# Patient Record
Sex: MALE | Race: BLACK OR AFRICAN AMERICAN | NOT HISPANIC OR LATINO | Employment: FULL TIME | URBAN - METROPOLITAN AREA
[De-identification: names, ages, dates, MRNs, and addresses within clinical notes are randomized per-mention and may not be internally consistent; named-entity substitution may affect disease eponyms.]

---

## 2023-12-20 ENCOUNTER — APPOINTMENT (OUTPATIENT)
Dept: RADIOLOGY | Facility: HOSPITAL | Age: 53
End: 2023-12-20
Payer: COMMERCIAL

## 2023-12-20 ENCOUNTER — ANCILLARY PROCEDURE (OUTPATIENT)
Dept: EMERGENCY MEDICINE | Facility: HOSPITAL | Age: 53
End: 2023-12-20
Payer: COMMERCIAL

## 2023-12-20 ENCOUNTER — HOSPITAL ENCOUNTER (EMERGENCY)
Facility: HOSPITAL | Age: 53
Discharge: HOME | End: 2023-12-20
Payer: COMMERCIAL

## 2023-12-20 VITALS
WEIGHT: 145 LBS | OXYGEN SATURATION: 99 % | HEIGHT: 67 IN | BODY MASS INDEX: 22.76 KG/M2 | SYSTOLIC BLOOD PRESSURE: 179 MMHG | DIASTOLIC BLOOD PRESSURE: 98 MMHG | HEART RATE: 93 BPM | TEMPERATURE: 97.7 F | RESPIRATION RATE: 16 BRPM

## 2023-12-20 DIAGNOSIS — I10 HYPERTENSION, UNSPECIFIED TYPE: ICD-10-CM

## 2023-12-20 DIAGNOSIS — M10.9 ACUTE GOUT OF LEFT WRIST, UNSPECIFIED CAUSE: Primary | ICD-10-CM

## 2023-12-20 LAB
ALBUMIN SERPL BCP-MCNC: 4.3 G/DL (ref 3.4–5)
ALP SERPL-CCNC: 80 U/L (ref 33–120)
ALT SERPL W P-5'-P-CCNC: 14 U/L (ref 10–52)
ANION GAP SERPL CALC-SCNC: 15 MMOL/L (ref 10–20)
AST SERPL W P-5'-P-CCNC: 19 U/L (ref 9–39)
BASOPHILS # BLD AUTO: 0.03 X10*3/UL (ref 0–0.1)
BASOPHILS NFR BLD AUTO: 0.5 %
BILIRUB SERPL-MCNC: 1.2 MG/DL (ref 0–1.2)
BUN SERPL-MCNC: 9 MG/DL (ref 6–23)
CALCIUM SERPL-MCNC: 9.6 MG/DL (ref 8.6–10.6)
CHLORIDE SERPL-SCNC: 96 MMOL/L (ref 98–107)
CO2 SERPL-SCNC: 26 MMOL/L (ref 21–32)
CREAT SERPL-MCNC: 0.72 MG/DL (ref 0.5–1.3)
EOSINOPHIL # BLD AUTO: 0.03 X10*3/UL (ref 0–0.7)
EOSINOPHIL NFR BLD AUTO: 0.5 %
ERYTHROCYTE [DISTWIDTH] IN BLOOD BY AUTOMATED COUNT: 13.8 % (ref 11.5–14.5)
GFR SERPL CREATININE-BSD FRML MDRD: >90 ML/MIN/1.73M*2
GLUCOSE SERPL-MCNC: 145 MG/DL (ref 74–99)
HCT VFR BLD AUTO: 37.4 % (ref 41–52)
HGB BLD-MCNC: 12.6 G/DL (ref 13.5–17.5)
IMM GRANULOCYTES # BLD AUTO: 0.04 X10*3/UL (ref 0–0.7)
IMM GRANULOCYTES NFR BLD AUTO: 0.7 % (ref 0–0.9)
LYMPHOCYTES # BLD AUTO: 0.64 X10*3/UL (ref 1.2–4.8)
LYMPHOCYTES NFR BLD AUTO: 11.3 %
MCH RBC QN AUTO: 29.2 PG (ref 26–34)
MCHC RBC AUTO-ENTMCNC: 33.7 G/DL (ref 32–36)
MCV RBC AUTO: 87 FL (ref 80–100)
MONOCYTES # BLD AUTO: 1.01 X10*3/UL (ref 0.1–1)
MONOCYTES NFR BLD AUTO: 17.8 %
NEUTROPHILS # BLD AUTO: 3.92 X10*3/UL (ref 1.2–7.7)
NEUTROPHILS NFR BLD AUTO: 69.2 %
NRBC BLD-RTO: 0 /100 WBCS (ref 0–0)
PLATELET # BLD AUTO: 153 X10*3/UL (ref 150–450)
POTASSIUM SERPL-SCNC: 3.9 MMOL/L (ref 3.5–5.3)
PROT SERPL-MCNC: 7.9 G/DL (ref 6.4–8.2)
RBC # BLD AUTO: 4.32 X10*6/UL (ref 4.5–5.9)
SODIUM SERPL-SCNC: 133 MMOL/L (ref 136–145)
URATE SERPL-MCNC: 7.5 MG/DL (ref 4–7.5)
WBC # BLD AUTO: 5.7 X10*3/UL (ref 4.4–11.3)

## 2023-12-20 PROCEDURE — 85025 COMPLETE CBC W/AUTO DIFF WBC: CPT | Performed by: PHYSICIAN ASSISTANT

## 2023-12-20 PROCEDURE — 93005 ELECTROCARDIOGRAM TRACING: CPT

## 2023-12-20 PROCEDURE — 2500000001 HC RX 250 WO HCPCS SELF ADMINISTERED DRUGS (ALT 637 FOR MEDICARE OP): Mod: SE | Performed by: PHYSICIAN ASSISTANT

## 2023-12-20 PROCEDURE — 73090 X-RAY EXAM OF FOREARM: CPT | Mod: LT

## 2023-12-20 PROCEDURE — 36415 COLL VENOUS BLD VENIPUNCTURE: CPT | Performed by: PHYSICIAN ASSISTANT

## 2023-12-20 PROCEDURE — 73090 X-RAY EXAM OF FOREARM: CPT | Mod: LEFT SIDE | Performed by: RADIOLOGY

## 2023-12-20 PROCEDURE — 99284 EMERGENCY DEPT VISIT MOD MDM: CPT | Mod: 25

## 2023-12-20 PROCEDURE — 80053 COMPREHEN METABOLIC PANEL: CPT | Performed by: PHYSICIAN ASSISTANT

## 2023-12-20 PROCEDURE — 2500000004 HC RX 250 GENERAL PHARMACY W/ HCPCS (ALT 636 FOR OP/ED): Mod: SE | Performed by: PHYSICIAN ASSISTANT

## 2023-12-20 PROCEDURE — 84550 ASSAY OF BLOOD/URIC ACID: CPT | Performed by: PHYSICIAN ASSISTANT

## 2023-12-20 PROCEDURE — 73110 X-RAY EXAM OF WRIST: CPT | Mod: LT

## 2023-12-20 PROCEDURE — 99284 EMERGENCY DEPT VISIT MOD MDM: CPT | Performed by: PHYSICIAN ASSISTANT

## 2023-12-20 PROCEDURE — 73110 X-RAY EXAM OF WRIST: CPT | Mod: LEFT SIDE | Performed by: RADIOLOGY

## 2023-12-20 PROCEDURE — 2500000001 HC RX 250 WO HCPCS SELF ADMINISTERED DRUGS (ALT 637 FOR MEDICARE OP)

## 2023-12-20 PROCEDURE — 96374 THER/PROPH/DIAG INJ IV PUSH: CPT

## 2023-12-20 RX ORDER — AMLODIPINE BESYLATE 5 MG/1
10 TABLET ORAL DAILY
Qty: 60 TABLET | Refills: 0 | Status: SHIPPED | OUTPATIENT
Start: 2023-12-20 | End: 2024-01-19

## 2023-12-20 RX ORDER — HYDRALAZINE HYDROCHLORIDE 20 MG/ML
10 INJECTION INTRAMUSCULAR; INTRAVENOUS ONCE
Status: COMPLETED | OUTPATIENT
Start: 2023-12-20 | End: 2023-12-20

## 2023-12-20 RX ORDER — COLCHICINE 0.6 MG/1
1.2 TABLET ORAL DAILY
Status: DISCONTINUED | OUTPATIENT
Start: 2023-12-20 | End: 2023-12-20

## 2023-12-20 RX ORDER — PREDNISONE 20 MG/1
60 TABLET ORAL ONCE
Status: COMPLETED | OUTPATIENT
Start: 2023-12-20 | End: 2023-12-20

## 2023-12-20 RX ORDER — IBUPROFEN 600 MG/1
TABLET ORAL
Status: COMPLETED
Start: 2023-12-20 | End: 2023-12-20

## 2023-12-20 RX ORDER — COLCHICINE 0.6 MG/1
0.6 TABLET ORAL ONCE
Status: COMPLETED | OUTPATIENT
Start: 2023-12-20 | End: 2023-12-20

## 2023-12-20 RX ORDER — PREDNISONE 20 MG/1
60 TABLET ORAL DAILY
Qty: 15 TABLET | Refills: 0 | Status: SHIPPED | OUTPATIENT
Start: 2023-12-20 | End: 2023-12-25

## 2023-12-20 RX ORDER — COLCHICINE 0.6 MG/1
0.6 TABLET ORAL DAILY
Status: DISCONTINUED | OUTPATIENT
Start: 2023-12-20 | End: 2023-12-20

## 2023-12-20 RX ORDER — IBUPROFEN 600 MG/1
600 TABLET ORAL ONCE
Status: COMPLETED | OUTPATIENT
Start: 2023-12-20 | End: 2023-12-20

## 2023-12-20 RX ORDER — COLCHICINE 0.6 MG/1
1.2 TABLET ORAL ONCE
Status: COMPLETED | OUTPATIENT
Start: 2023-12-20 | End: 2023-12-20

## 2023-12-20 RX ORDER — COLCHICINE 0.6 MG/1
0.6 TABLET ORAL 2 TIMES DAILY
Qty: 10 TABLET | Refills: 0 | Status: SHIPPED | OUTPATIENT
Start: 2023-12-20 | End: 2023-12-25

## 2023-12-20 RX ADMIN — PREDNISONE 60 MG: 20 TABLET ORAL at 15:45

## 2023-12-20 RX ADMIN — IBUPROFEN 600 MG: 600 TABLET, FILM COATED ORAL at 13:13

## 2023-12-20 RX ADMIN — IBUPROFEN 600 MG: 600 TABLET ORAL at 13:13

## 2023-12-20 RX ADMIN — COLCHICINE 0.6 MG: 0.6 TABLET, FILM COATED ORAL at 17:36

## 2023-12-20 RX ADMIN — HYDRALAZINE HYDROCHLORIDE 10 MG: 20 INJECTION INTRAMUSCULAR; INTRAVENOUS at 15:20

## 2023-12-20 RX ADMIN — COLCHICINE 1.2 MG: 0.6 TABLET, FILM COATED ORAL at 15:44

## 2023-12-20 ASSESSMENT — LIFESTYLE VARIABLES
EVER FELT BAD OR GUILTY ABOUT YOUR DRINKING: NO
HAVE YOU EVER FELT YOU SHOULD CUT DOWN ON YOUR DRINKING: NO
REASON UNABLE TO ASSESS: NO
HAVE PEOPLE ANNOYED YOU BY CRITICIZING YOUR DRINKING: NO
EVER HAD A DRINK FIRST THING IN THE MORNING TO STEADY YOUR NERVES TO GET RID OF A HANGOVER: NO

## 2023-12-20 ASSESSMENT — PAIN - FUNCTIONAL ASSESSMENT: PAIN_FUNCTIONAL_ASSESSMENT: 0-10

## 2023-12-20 ASSESSMENT — COLUMBIA-SUICIDE SEVERITY RATING SCALE - C-SSRS
6. HAVE YOU EVER DONE ANYTHING, STARTED TO DO ANYTHING, OR PREPARED TO DO ANYTHING TO END YOUR LIFE?: NO
2. HAVE YOU ACTUALLY HAD ANY THOUGHTS OF KILLING YOURSELF?: NO
1. IN THE PAST MONTH, HAVE YOU WISHED YOU WERE DEAD OR WISHED YOU COULD GO TO SLEEP AND NOT WAKE UP?: NO

## 2023-12-20 ASSESSMENT — PAIN SCALES - GENERAL
PAINLEVEL_OUTOF10: 6
PAINLEVEL_OUTOF10: 9

## 2023-12-20 NOTE — ED TRIAGE NOTES
Pt presents with pain, swelling and possible deformity to left wrist, denies injury, has hx of fractures to same area. Pt has been without HTN meds for approx 2 months after running out

## 2023-12-20 NOTE — ED PROVIDER NOTES
"HPI     53-year-old male with a past medical history of hypertension and gout presenting to the emergency department today with complaint of left wrist pain.  Patient states he went to sleep last evening without any pain or symptoms to his left wrist.  When he awoke he noticed pain and swelling and difficulty moving his wrist.  He denies any recent falls heavy lifting or direct trauma.  He did report history of prior wrist fracture approximately 1 year ago.  He denies fevers chills nausea vomiting chest pain shortness of breath cough or any recent illness.    Visit Vitals  BP (!) 198/123   Pulse 90   Temp 36.5 °C (97.7 °F) (Temporal)   Resp 17   Ht 1.702 m (5' 7\")   Wt 65.8 kg (145 lb)   SpO2 97%   BMI 22.71 kg/m²   BSA 1.76 m²          Physical Exam     Physical exam:   General: Vitals noted, no distress. Afebrile.   EENT:  Hearing grossly intact. Normal phonation. MMM. Airway patient. EOMI.   Neck: No midline tenderness or paraspinal tenderness. FROM.   Cardiac: Regular, rate, rhythm. Normal S1 and S2.  No murmurs, gallops, rubs.   Pulmonary: Good air exchange. Lungs clear bilaterally. No wheezes, rhonchi, rales. No accessory muscle use.   Abdomen: Soft, nonsurgical. Nontender. No peritoneal signs. Normoactive bowel sounds.   Back: No CVA tenderness. No midline tenderness or paraspinal tenderness. No obvious deformity.   Extremities: Moderate swelling to the dorsal aspect of the left wrist with tenderness to palpation.  Full range of motion. Moves all extremities freely. No gross deformities   skin: No rash. Warm and Dry.   Neuro: No focal neurologic deficits. CN 2-12 grossly intact. Sensation equal bilaterally. No weakness.         Labs Reviewed - No data to display    XR wrist left 3+ views    (Results Pending)   XR forearm left 2 views    (Results Pending)         ED Course & MDM     Medical Decision Making     Patient was seen and evaluated.  HPI and physical exam were performed.  Nursing notes and EMR were " reviewed.  Mr. Krueger was evaluated in the emergency department today for atraumatic left wrist pain.  Plain radiographs of the left wrist and forearm were obtained and reviewed and negative for acute fractures, dislocation or foreign body.  Soft tissue swelling was noted.  With a past medical history of gout patient received colchicine and prednisone.  He was found initially hypertensive with a blood pressure 193/112.  He received hydralazine 10 mg IVP x 1 with his blood pressure downtrending to 179/98.  Mr. Krueger reported minimal improvement with initial treatment for gout provided.  Patient will receive an additional dose of colchicine 0.6 mg and will be reassessed.  Patient is alert and oriented x 4 and appears in no acute distress.  Mr. Krueger was endorsed to my colleague for final diagnosis and disposition in stable condition.  Diagnoses as of 12/21/23 0003   Acute gout of left wrist, unspecified cause   Hypertension, unspecified type       Procedures    LILY Person PA-C  12/21/23 0801

## 2023-12-20 NOTE — PROGRESS NOTES
This patient was handed off to me at 5 PM.  For more extensive history, physical exam, and MDM, please see previous providers note.  Briefly, this is a 53-year-old male with a past medical history of hypertension and gout who presented to the emergency room with left wrist pain.  Patient denies any traumas or dislocations but did have a remote fracture in his wrist from 1 year ago.  Patient was handed off to me with x-ray imaging and reevaluation still pending.  Lab work was unremarkable.  X-rays were significant for soft tissue edema with a remote scaphoid fracture with nonunion and a remote radial styloid and dorsal triquetral fracture.  There is also a moderate amount of multifocal osteoarthritis.  I did describe all of these findings to patient and he agreed to follow-up with his primary care provider in the outpatient setting.  Patient was further reassessed and did have an improvement of symptoms.  Patient will be discharged home with colchicine and prednisone for treatment of gout.  Furthermore, patient will be prescribed his home medication of amlodipine 10 mg and I strongly recommended patient to follow-up with primary care for adherence to blood pressure regimen.  Patient was agreeable to this plan and had no further questions.  I did provide patient with the number for the [facility] for outpatient follow-up.  Patient will be discharged home.  Patient understands that if symptoms worsen or change in any way, they should return for immediate medical attention.  Patient understands that they should follow-up with their primary care physician within the next week to follow-up for gout, hypertension.  Patient was stable upon discharge.

## 2023-12-21 LAB
ATRIAL RATE: 92 BPM
P AXIS: 57 DEGREES
P OFFSET: 199 MS
P ONSET: 153 MS
PR INTERVAL: 142 MS
Q ONSET: 224 MS
QRS COUNT: 15 BEATS
QRS DURATION: 76 MS
QT INTERVAL: 362 MS
QTC CALCULATION(BAZETT): 447 MS
QTC FREDERICIA: 417 MS
R AXIS: 80 DEGREES
T AXIS: 25 DEGREES
T OFFSET: 405 MS
VENTRICULAR RATE: 92 BPM

## 2024-06-21 ENCOUNTER — HOSPITAL ENCOUNTER (EMERGENCY)
Facility: HOSPITAL | Age: 54
Discharge: HOME | End: 2024-06-21
Attending: EMERGENCY MEDICINE
Payer: COMMERCIAL

## 2024-06-21 VITALS
RESPIRATION RATE: 16 BRPM | HEIGHT: 67 IN | WEIGHT: 145 LBS | SYSTOLIC BLOOD PRESSURE: 163 MMHG | HEART RATE: 73 BPM | DIASTOLIC BLOOD PRESSURE: 92 MMHG | OXYGEN SATURATION: 97 % | BODY MASS INDEX: 22.76 KG/M2 | TEMPERATURE: 98.3 F

## 2024-06-21 DIAGNOSIS — I10 HYPERTENSION, UNSPECIFIED TYPE: Primary | ICD-10-CM

## 2024-06-21 DIAGNOSIS — R51.9 NONINTRACTABLE HEADACHE, UNSPECIFIED CHRONICITY PATTERN, UNSPECIFIED HEADACHE TYPE: ICD-10-CM

## 2024-06-21 DIAGNOSIS — Z76.0 MEDICATION REFILL: ICD-10-CM

## 2024-06-21 PROCEDURE — 2500000001 HC RX 250 WO HCPCS SELF ADMINISTERED DRUGS (ALT 637 FOR MEDICARE OP): Mod: SE | Performed by: EMERGENCY MEDICINE

## 2024-06-21 PROCEDURE — 99283 EMERGENCY DEPT VISIT LOW MDM: CPT | Performed by: EMERGENCY MEDICINE

## 2024-06-21 PROCEDURE — 99284 EMERGENCY DEPT VISIT MOD MDM: CPT | Performed by: EMERGENCY MEDICINE

## 2024-06-21 RX ORDER — ACETAMINOPHEN 325 MG/1
650 TABLET ORAL ONCE
Status: COMPLETED | OUTPATIENT
Start: 2024-06-21 | End: 2024-06-21

## 2024-06-21 RX ORDER — AMLODIPINE BESYLATE 5 MG/1
10 TABLET ORAL ONCE
Status: COMPLETED | OUTPATIENT
Start: 2024-06-21 | End: 2024-06-21

## 2024-06-21 RX ORDER — ACETAMINOPHEN 325 MG/1
650 TABLET ORAL EVERY 6 HOURS PRN
Qty: 30 TABLET | Refills: 0 | Status: SHIPPED | OUTPATIENT
Start: 2024-06-21

## 2024-06-21 RX ORDER — METOCLOPRAMIDE 10 MG/1
10 TABLET ORAL ONCE
Status: COMPLETED | OUTPATIENT
Start: 2024-06-21 | End: 2024-06-21

## 2024-06-21 RX ORDER — AMLODIPINE BESYLATE 10 MG/1
10 TABLET ORAL DAILY
Qty: 30 TABLET | Refills: 0 | Status: SHIPPED | OUTPATIENT
Start: 2024-06-21 | End: 2024-07-21

## 2024-06-21 ASSESSMENT — COLUMBIA-SUICIDE SEVERITY RATING SCALE - C-SSRS
6. HAVE YOU EVER DONE ANYTHING, STARTED TO DO ANYTHING, OR PREPARED TO DO ANYTHING TO END YOUR LIFE?: NO
1. IN THE PAST MONTH, HAVE YOU WISHED YOU WERE DEAD OR WISHED YOU COULD GO TO SLEEP AND NOT WAKE UP?: NO
2. HAVE YOU ACTUALLY HAD ANY THOUGHTS OF KILLING YOURSELF?: NO

## 2024-06-21 NOTE — ED PROVIDER NOTES
HPI   Chief Complaint   Patient presents with    Med Refill       HPI  The patient is a 53 year old male with history of HTN, gout who presents to the ED for a refill on his blood pressure medication. The patient reports that he has been out of his blood pressure medication for the past month. He reports that his primary care physician is currently out of town. He now presents to the ED requesting a refill on his home amlodipine. He currently also reports a mild generalized headache of gradual onset, not the worst headache of his life. He has not taken anything for his headache yet. No trauma, falls, or injuries. No passing out. He reports that he was out in the heat for awhile today. He has still been eating and drinking fluids. No dizziness, vision changes, neck pain, neck stiffness, back pain, chest pain, shortness of breath, nausea, vomiting, abdominal pain, diarrhea, urinary symptoms, weakness, numbness, tingling, fevers, or chills. No sick contacts.                  No data recorded                   Patient History   No past medical history on file.  No past surgical history on file.  No family history on file.  Social History     Tobacco Use    Smoking status: Not on file    Smokeless tobacco: Not on file   Substance Use Topics    Alcohol use: Not on file    Drug use: Not on file       Physical Exam   ED Triage Vitals [06/21/24 0119]   Temperature Heart Rate Respirations BP   36.8 °C (98.3 °F) 84 16 (!) 163/104      Pulse Ox Temp Source Heart Rate Source Patient Position   95 % Oral -- --      BP Location FiO2 (%)     -- --       Physical Exam  General: awake, alert, oriented x 3, no acute distress. Resting calmly   Head: normocephalic, atraumatic, face is symmetric.   Eyes: PERRL, EOMI, no scleral icterus  Mouth: mucous membranes moist, no oral lesions identified  Neck: supple, full ROM intact, trachea is midline, no meningeal signs.   CV: RRR, normal S1/S2  Resp: equal rise bilaterally, normal respiratory  effort, CTAB, no wheezing, rhonchi, or rales.  ABD: soft, nontender, nondistended, no guarding or rigidity  Neuro: No focal deficits. Cranial nerves 2-12 intact. Strength 5/5 in upper and lower extremities bilaterally. Sensation intact to light touch throughout.   MSK: No cyanosis, clubbing, or edema. Moves all extremities with good tone. No deformities.   Skin: warm, dry, and intact  Psych: Appropriate mood and affect     ED Course & MDM   Diagnoses as of 06/21/24 0421   Hypertension, unspecified type   Medication refill   Nonintractable headache, unspecified chronicity pattern, unspecified headache type       Medical Decision Making  In the Emergency Department, hospital records were reviewed. The patient is afebrile with stable vital signs. Initial BP in triage was 163/104 and repeat blood pressure after my evaluation of the patient prior to any intervention was 163/92. The patient is in no respiratory distress, satting well on room air. Patient is neurologically and neurovascularly intact. He is mentating appropriately. In the ED, the patient was given his home dose of amlodipine 10 mg, as well as tylenol and reglan to treat his headache. Patient's headache was gradual in onset, not the worst headache of his life. No trauma, falls, or injuries. No passing out. Patient is neurologically intact. No meningeal signs. CT head was considered but not felt to be indicated at this time. Patient is well appearing, resting comfortably. He denies any other complaints. Patient was written a refill on his home amlodipine 10 mg, and written a script for tylenol as needed. The patient felt comfortable being discharged home. The patient was instructed of supportive measures and to follow-up closely with his primary care physician. Return precautions were provided, for which the patient expressed understanding. The patient was discharged home in stable condition. They should feel free to return to the Emergency Department at any  time should their condition worsen or should they have any questions or concerns.     Procedure  Procedures     Odalys Tom MD  06/21/24 9938

## 2025-02-08 ENCOUNTER — HOSPITAL ENCOUNTER (EMERGENCY)
Facility: HOSPITAL | Age: 55
Discharge: HOME | End: 2025-02-08
Payer: COMMERCIAL

## 2025-02-08 VITALS
TEMPERATURE: 98.4 F | OXYGEN SATURATION: 94 % | WEIGHT: 150 LBS | HEART RATE: 91 BPM | DIASTOLIC BLOOD PRESSURE: 98 MMHG | SYSTOLIC BLOOD PRESSURE: 157 MMHG | BODY MASS INDEX: 23.54 KG/M2 | HEIGHT: 67 IN | RESPIRATION RATE: 17 BRPM

## 2025-02-08 DIAGNOSIS — R21 RASH: ICD-10-CM

## 2025-02-08 DIAGNOSIS — B86 SCABIES: Primary | ICD-10-CM

## 2025-02-08 PROCEDURE — 99284 EMERGENCY DEPT VISIT MOD MDM: CPT

## 2025-02-08 PROCEDURE — 99283 EMERGENCY DEPT VISIT LOW MDM: CPT

## 2025-02-08 RX ORDER — PERMETHRIN 50 MG/G
1 CREAM TOPICAL ONCE
Qty: 60 G | Refills: 0 | Status: SHIPPED | OUTPATIENT
Start: 2025-02-08 | End: 2025-02-08

## 2025-02-08 RX ORDER — IVERMECTIN 3 MG/1
200 TABLET ORAL SEE ADMIN INSTRUCTIONS
Qty: 5 TABLET | Refills: 0 | Status: SHIPPED | OUTPATIENT
Start: 2025-02-08

## 2025-02-08 ASSESSMENT — PAIN - FUNCTIONAL ASSESSMENT: PAIN_FUNCTIONAL_ASSESSMENT: 0-10

## 2025-02-08 NOTE — ED PROVIDER NOTES
HPI   Chief Complaint   Patient presents with    Rash       Patient is a 54-year-old male presenting to the ED with a rash.  Patient states he has a known scabies infection involving his bilateral upper and lower extremities.  He states he has been on permethrin cream in the past, although is now requesting pills.  He endorses an itchy rash that bleeds when he scratches it.  He denies any associated pain, fevers, chills, flulike symptoms, eruption, or drainage from any of the sites.              Patient History   No past medical history on file.  No past surgical history on file.  No family history on file.  Social History     Tobacco Use    Smoking status: Not on file    Smokeless tobacco: Not on file   Substance Use Topics    Alcohol use: Not on file    Drug use: Not on file       Physical Exam   ED Triage Vitals [02/08/25 1200]   Temperature Heart Rate Respirations BP   36.9 °C (98.4 °F) 91 17 (!) 157/98      Pulse Ox Temp Source Heart Rate Source Patient Position   94 % Temporal Monitor Sitting      BP Location FiO2 (%)     Left arm --       Physical Exam  Vitals reviewed.   Constitutional:       General: He is not in acute distress.     Appearance: He is not ill-appearing.   Cardiovascular:      Rate and Rhythm: Normal rate.   Pulmonary:      Effort: Pulmonary effort is normal. No respiratory distress.   Skin:     Comments: Very mild papular rash on the bilateral upper extremities.  No weeping, crusting, bleeding, blistering, or skin sloughing.  No involvement of the palms.   Neurological:      General: No focal deficit present.      Mental Status: He is alert.           ED Course & MDM   Diagnoses as of 02/08/25 1227   Scabies   Rash                 No data recorded     Kaylee Coma Scale Score: 15 (02/08/25 1201 : Juana Epperson RN)                           Medical Decision Making  Patient is a 54-year-old male presenting to the ED with a rash.  History obtained from the patient.  Has scabies of his  bilateral upper and lower extremities previously diagnosed.  Has been on permethrin cream and now requesting pills.  Endorses an itchy rash that bleeds when scratched.  No pain, flulike symptoms, eruption, or drainage from the rash sites.  On physical exam, patient is sitting comfortably and in no apparent distress.  There is a very mild papular rash on the bilateral upper extremities.  No weeping, crusting, bleeding, blistering, or skin sloughing.  No involvement of the palms of the hands.  Remainder of exam as noted above.  Patient is afebrile with stable vital signs.    Rash consistent with patient's known history of scabies infection.  No evidence of more concerning etiology such as SJS or TENS.  Will step up treatment to oral ivermectin at this time.  Unfortunately, this is not something the pharmacy has here.  Patient remained stable and ready for discharge.  Prescription for ivermectin as well as more permethrin cream sent to his pharmacy.  Patient given very strict usage instructions for which he verbalized understanding.  Highly recommended he follow-up with dermatology and their phone number was provided in his discharge paperwork.  I told him to call first thing Monday morning to make an appointment for ASAP.  Patient is agreeable to this plan and all of his questions were answered to satisfaction.  He was discharged in stable condition.        Procedure  Procedures     Rachel Gates PA-C  02/08/25 5652

## 2025-02-08 NOTE — DISCHARGE INSTRUCTIONS
I refilled your permethrin cream.  You are to apply 1 topical application and leave it on for 8-14 hours before removing it by showering.  Otherwise, I did write you a pill to treat your scabies called ivermectin.  They are very strict dosage and instructions regarding this.  You are to take it as prescribed on these exact days: Saturday 2/8, Sunday 2/9, Saturday 2/15, Sunday 2/16, and then Saturday 2/22.  I would also recommend following up with a dermatologist.  You can call our dermatology clinic to schedule an appointment at [phone].

## 2025-02-08 NOTE — ED TRIAGE NOTES
Pt presents with known scabies to BUE and BLE, ongoing, states he wants a script for pills and not a cream

## 2025-02-11 RX ORDER — IVERMECTIN 3 MG/1
200 TABLET ORAL SEE ADMIN INSTRUCTIONS
Qty: 23 TABLET | Refills: 0 | Status: SHIPPED | OUTPATIENT
Start: 2025-02-11 | End: 2025-02-16

## 2025-04-19 ENCOUNTER — HOSPITAL ENCOUNTER (OUTPATIENT)
Facility: HOSPITAL | Age: 55
Setting detail: OBSERVATION
Discharge: HOME | End: 2025-04-21
Attending: EMERGENCY MEDICINE
Payer: COMMERCIAL

## 2025-04-19 DIAGNOSIS — L03.116 CELLULITIS OF LEFT FOOT: Primary | ICD-10-CM

## 2025-04-19 DIAGNOSIS — E87.6 HYPOKALEMIA: ICD-10-CM

## 2025-04-19 PROCEDURE — 99285 EMERGENCY DEPT VISIT HI MDM: CPT | Performed by: EMERGENCY MEDICINE

## 2025-04-19 RX ORDER — MORPHINE SULFATE 4 MG/ML
4 INJECTION INTRAVENOUS ONCE
Status: COMPLETED | OUTPATIENT
Start: 2025-04-19 | End: 2025-04-20

## 2025-04-19 RX ORDER — CEFAZOLIN SODIUM 2 G/100ML
2 INJECTION, SOLUTION INTRAVENOUS ONCE
Status: COMPLETED | OUTPATIENT
Start: 2025-04-19 | End: 2025-04-20

## 2025-04-19 RX ORDER — VANCOMYCIN HYDROCHLORIDE 1 G/20ML
INJECTION, POWDER, LYOPHILIZED, FOR SOLUTION INTRAVENOUS DAILY PRN
Status: DISCONTINUED | OUTPATIENT
Start: 2025-04-19 | End: 2025-04-19

## 2025-04-20 ENCOUNTER — APPOINTMENT (OUTPATIENT)
Dept: RADIOLOGY | Facility: HOSPITAL | Age: 55
End: 2025-04-20
Payer: COMMERCIAL

## 2025-04-20 PROBLEM — L03.116 CELLULITIS OF LEFT FOOT: Status: ACTIVE | Noted: 2025-04-20

## 2025-04-20 LAB
ANION GAP SERPL CALC-SCNC: 15 MMOL/L (ref 10–20)
BASOPHILS # BLD AUTO: 0.03 X10*3/UL (ref 0–0.1)
BASOPHILS NFR BLD AUTO: 0.2 %
BUN SERPL-MCNC: 7 MG/DL (ref 6–23)
CALCIUM SERPL-MCNC: 9.6 MG/DL (ref 8.6–10.6)
CHLORIDE SERPL-SCNC: 101 MMOL/L (ref 98–107)
CO2 SERPL-SCNC: 28 MMOL/L (ref 21–32)
CREAT SERPL-MCNC: 0.68 MG/DL (ref 0.5–1.3)
CRP SERPL-MCNC: 9.56 MG/DL
EGFRCR SERPLBLD CKD-EPI 2021: >90 ML/MIN/1.73M*2
EOSINOPHIL # BLD AUTO: 0.2 X10*3/UL (ref 0–0.7)
EOSINOPHIL NFR BLD AUTO: 1.5 %
ERYTHROCYTE [DISTWIDTH] IN BLOOD BY AUTOMATED COUNT: 11.7 % (ref 11.5–14.5)
ERYTHROCYTE [SEDIMENTATION RATE] IN BLOOD BY WESTERGREN METHOD: 69 MM/H (ref 0–20)
GLUCOSE SERPL-MCNC: 88 MG/DL (ref 74–99)
HCT VFR BLD AUTO: 33.9 % (ref 41–52)
HGB BLD-MCNC: 11 G/DL (ref 13.5–17.5)
HOLD SPECIMEN: NORMAL
IMM GRANULOCYTES # BLD AUTO: 0.05 X10*3/UL (ref 0–0.7)
IMM GRANULOCYTES NFR BLD AUTO: 0.4 % (ref 0–0.9)
LACTATE SERPL-SCNC: 1.2 MMOL/L (ref 0.4–2)
LYMPHOCYTES # BLD AUTO: 1.67 X10*3/UL (ref 1.2–4.8)
LYMPHOCYTES NFR BLD AUTO: 12.8 %
MCH RBC QN AUTO: 30.7 PG (ref 26–34)
MCHC RBC AUTO-ENTMCNC: 32.4 G/DL (ref 32–36)
MCV RBC AUTO: 95 FL (ref 80–100)
MONOCYTES # BLD AUTO: 1.32 X10*3/UL (ref 0.1–1)
MONOCYTES NFR BLD AUTO: 10.1 %
NEUTROPHILS # BLD AUTO: 9.74 X10*3/UL (ref 1.2–7.7)
NEUTROPHILS NFR BLD AUTO: 75 %
NRBC BLD-RTO: 0 /100 WBCS (ref 0–0)
PLATELET # BLD AUTO: 288 X10*3/UL (ref 150–450)
POTASSIUM SERPL-SCNC: 3.2 MMOL/L (ref 3.5–5.3)
RBC # BLD AUTO: 3.58 X10*6/UL (ref 4.5–5.9)
SODIUM SERPL-SCNC: 141 MMOL/L (ref 136–145)
WBC # BLD AUTO: 13 X10*3/UL (ref 4.4–11.3)

## 2025-04-20 PROCEDURE — 83605 ASSAY OF LACTIC ACID: CPT

## 2025-04-20 PROCEDURE — 73630 X-RAY EXAM OF FOOT: CPT | Mod: LEFT SIDE | Performed by: RADIOLOGY

## 2025-04-20 PROCEDURE — 2500000004 HC RX 250 GENERAL PHARMACY W/ HCPCS (ALT 636 FOR OP/ED): Mod: JZ,SE

## 2025-04-20 PROCEDURE — 36415 COLL VENOUS BLD VENIPUNCTURE: CPT

## 2025-04-20 PROCEDURE — 80048 BASIC METABOLIC PNL TOTAL CA: CPT

## 2025-04-20 PROCEDURE — 96365 THER/PROPH/DIAG IV INF INIT: CPT

## 2025-04-20 PROCEDURE — 73610 X-RAY EXAM OF ANKLE: CPT | Mod: LT

## 2025-04-20 PROCEDURE — G0378 HOSPITAL OBSERVATION PER HR: HCPCS

## 2025-04-20 PROCEDURE — 2500000002 HC RX 250 W HCPCS SELF ADMINISTERED DRUGS (ALT 637 FOR MEDICARE OP, ALT 636 FOR OP/ED): Mod: SE | Performed by: PHYSICIAN ASSISTANT

## 2025-04-20 PROCEDURE — 86140 C-REACTIVE PROTEIN: CPT

## 2025-04-20 PROCEDURE — 2500000001 HC RX 250 WO HCPCS SELF ADMINISTERED DRUGS (ALT 637 FOR MEDICARE OP): Mod: SE | Performed by: STUDENT IN AN ORGANIZED HEALTH CARE EDUCATION/TRAINING PROGRAM

## 2025-04-20 PROCEDURE — 99223 1ST HOSP IP/OBS HIGH 75: CPT

## 2025-04-20 PROCEDURE — 2500000002 HC RX 250 W HCPCS SELF ADMINISTERED DRUGS (ALT 637 FOR MEDICARE OP, ALT 636 FOR OP/ED): Mod: SE

## 2025-04-20 PROCEDURE — 85025 COMPLETE CBC W/AUTO DIFF WBC: CPT

## 2025-04-20 PROCEDURE — 73610 X-RAY EXAM OF ANKLE: CPT | Mod: LEFT SIDE | Performed by: RADIOLOGY

## 2025-04-20 PROCEDURE — 96366 THER/PROPH/DIAG IV INF ADDON: CPT

## 2025-04-20 PROCEDURE — 85652 RBC SED RATE AUTOMATED: CPT

## 2025-04-20 PROCEDURE — 96375 TX/PRO/DX INJ NEW DRUG ADDON: CPT

## 2025-04-20 PROCEDURE — 73630 X-RAY EXAM OF FOOT: CPT | Mod: LT

## 2025-04-20 RX ORDER — CEPHALEXIN 500 MG/1
500 CAPSULE ORAL 4 TIMES DAILY
Qty: 28 CAPSULE | Refills: 0 | Status: SHIPPED | OUTPATIENT
Start: 2025-04-20 | End: 2025-04-27

## 2025-04-20 RX ORDER — CEFAZOLIN SODIUM 2 G/100ML
2 INJECTION, SOLUTION INTRAVENOUS EVERY 8 HOURS
Status: DISCONTINUED | OUTPATIENT
Start: 2025-04-20 | End: 2025-04-21 | Stop reason: HOSPADM

## 2025-04-20 RX ORDER — POLYETHYLENE GLYCOL 3350 17 G/17G
17 POWDER, FOR SOLUTION ORAL DAILY PRN
Status: DISCONTINUED | OUTPATIENT
Start: 2025-04-20 | End: 2025-04-21 | Stop reason: HOSPADM

## 2025-04-20 RX ORDER — ACETAMINOPHEN 325 MG/1
650 TABLET ORAL EVERY 6 HOURS PRN
Status: DISCONTINUED | OUTPATIENT
Start: 2025-04-20 | End: 2025-04-21 | Stop reason: HOSPADM

## 2025-04-20 RX ORDER — FLUTICASONE PROPIONATE 50 MCG
1 SPRAY, SUSPENSION (ML) NASAL DAILY
Status: DISCONTINUED | OUTPATIENT
Start: 2025-04-20 | End: 2025-04-21 | Stop reason: HOSPADM

## 2025-04-20 RX ORDER — AMLODIPINE BESYLATE 10 MG/1
10 TABLET ORAL DAILY
Status: DISCONTINUED | OUTPATIENT
Start: 2025-04-20 | End: 2025-04-20

## 2025-04-20 RX ORDER — SODIUM CHLORIDE, SODIUM LACTATE, POTASSIUM CHLORIDE, CALCIUM CHLORIDE 600; 310; 30; 20 MG/100ML; MG/100ML; MG/100ML; MG/100ML
100 INJECTION, SOLUTION INTRAVENOUS CONTINUOUS
Status: DISCONTINUED | OUTPATIENT
Start: 2025-04-20 | End: 2025-04-20

## 2025-04-20 RX ORDER — IBUPROFEN 400 MG/1
400 TABLET ORAL EVERY 6 HOURS PRN
Status: DISCONTINUED | OUTPATIENT
Start: 2025-04-20 | End: 2025-04-21 | Stop reason: HOSPADM

## 2025-04-20 RX ORDER — LOSARTAN POTASSIUM 50 MG/1
100 TABLET ORAL DAILY
Status: DISCONTINUED | OUTPATIENT
Start: 2025-04-20 | End: 2025-04-21 | Stop reason: HOSPADM

## 2025-04-20 RX ORDER — POTASSIUM CHLORIDE 20 MEQ/1
40 TABLET, EXTENDED RELEASE ORAL ONCE
Status: COMPLETED | OUTPATIENT
Start: 2025-04-20 | End: 2025-04-20

## 2025-04-20 RX ADMIN — MORPHINE SULFATE 4 MG: 4 INJECTION INTRAVENOUS at 01:00

## 2025-04-20 RX ADMIN — IBUPROFEN 400 MG: 400 TABLET ORAL at 02:59

## 2025-04-20 RX ADMIN — ACETAMINOPHEN 650 MG: 325 TABLET, FILM COATED ORAL at 23:04

## 2025-04-20 RX ADMIN — POTASSIUM CHLORIDE 40 MEQ: 1500 TABLET, EXTENDED RELEASE ORAL at 02:16

## 2025-04-20 RX ADMIN — CEFAZOLIN SODIUM 2 G: 2 INJECTION, SOLUTION INTRAVENOUS at 16:28

## 2025-04-20 RX ADMIN — CEFAZOLIN SODIUM 2 G: 2 INJECTION, SOLUTION INTRAVENOUS at 08:23

## 2025-04-20 RX ADMIN — LOSARTAN POTASSIUM 100 MG: 50 TABLET, FILM COATED ORAL at 08:23

## 2025-04-20 RX ADMIN — FLUTICASONE PROPIONATE 1 SPRAY: 50 SPRAY, METERED NASAL at 09:42

## 2025-04-20 RX ADMIN — CEFAZOLIN SODIUM 2 G: 2 INJECTION, SOLUTION INTRAVENOUS at 01:21

## 2025-04-20 RX ADMIN — IBUPROFEN 400 MG: 400 TABLET ORAL at 23:04

## 2025-04-20 ASSESSMENT — PAIN SCALES - GENERAL
PAINLEVEL_OUTOF10: 0 - NO PAIN
PAINLEVEL_OUTOF10: 10 - WORST POSSIBLE PAIN
PAINLEVEL_OUTOF10: 7
PAINLEVEL_OUTOF10: 5 - MODERATE PAIN
PAINLEVEL_OUTOF10: 2

## 2025-04-20 ASSESSMENT — LIFESTYLE VARIABLES
HAVE PEOPLE ANNOYED YOU BY CRITICIZING YOUR DRINKING: NO
HAVE YOU EVER FELT YOU SHOULD CUT DOWN ON YOUR DRINKING: NO
TOTAL SCORE: 0
EVER FELT BAD OR GUILTY ABOUT YOUR DRINKING: NO
EVER HAD A DRINK FIRST THING IN THE MORNING TO STEADY YOUR NERVES TO GET RID OF A HANGOVER: NO

## 2025-04-20 ASSESSMENT — PAIN DESCRIPTION - ORIENTATION
ORIENTATION: LEFT;RIGHT
ORIENTATION: LEFT
ORIENTATION: LEFT

## 2025-04-20 ASSESSMENT — PAIN DESCRIPTION - PAIN TYPE
TYPE: ACUTE PAIN
TYPE: ACUTE PAIN

## 2025-04-20 ASSESSMENT — PAIN - FUNCTIONAL ASSESSMENT: PAIN_FUNCTIONAL_ASSESSMENT: 0-10

## 2025-04-20 ASSESSMENT — PAIN DESCRIPTION - LOCATION
LOCATION: FOOT
LOCATION: FOOT

## 2025-04-20 NOTE — H&P
History and Physical  [facility] CLINICAL DECISION  Patient: Keith Krueger  MRN: [id number]  : 1970  Date of Evaluation: 2025  ED Provider: Pat Covarrubias PA-C      Limitations to history: none  Independent Historian: patient   External Records Reviewed: notes reviewed in patients chart       Patient History:  Keith Krueger is a 54 y.o. male with PMH of HTN who presents to the emergency department complaining of atraumatic left foot pain. States he developed pain, swelling, redness in the left foot yesterday which worsened over time. Denies any recent trauma or cuts to the foot. States he does have a history of gout but last episode was in his wrist. Denies fever, chills, pain else where, N/V, abdominal pain.     While in ED, workup included hypokalemia which was replaced orally, elevated ESR and CRP, CBC with leukocytosis but no anemia. Left foot and ankle without acute remy abnormalities. Started on IV ancef due to concern of cellulitis and given IV morphine.     After discussion with the ED provider, a decision was made to admit the patient to the Clinical Decision Unit. Upon admission to the Clinical Decision Unit, patient is in stable condition with notable swelling, warmth, erythema of the left foot. No acute complaints. Patient has agreed to the plan of continued IV antibiotics with pain control and further observation of cellulitis.     The acute evaluation included:  Orders Placed This Encounter   Procedures    XR foot left 3+ views    XR ankle left 3+ views    CBC and Auto Differential    Basic metabolic panel    Sedimentation rate, automated    C-reactive protein    Extra Tubes    SST TOP    Lactate    Adult diet Regular    Vital Signs    Activity (specify) No Restrictions    No telemetry or cardiac monitoring required    Full code    Electrocardiogram, 12-lead PRN ACS symptoms    Insert and maintain peripheral IV    Initiate Observation Send to CDU       I reviewed the  below labs and imaging as ordered by the ED provider:  XR foot left 3+ views   Final Result   1. No acute osseous abnormality.   2. Nonspecific diffuse soft tissue edema.             I personally reviewed the images/study and I agree with the findings   as stated. This study was interpreted at [place], [place].        MACRO:   None        Signed by: Carol Bowman 4/20/2025 1:37 AM   Dictation workstation:   [id number]      XR ankle left 3+ views   Final Result   1. No acute osseous abnormality.   2. Nonspecific diffuse soft tissue edema.             I personally reviewed the images/study and I agree with the findings   as stated. This study was interpreted at [place], [place].        MACRO:   None        Signed by: Carol Bowman 4/20/2025 1:37 AM   Dictation workstation:   [id number]          Labs Reviewed   CBC WITH AUTO DIFFERENTIAL - Abnormal       Result Value    WBC 13.0 (*)     nRBC 0.0      RBC 3.58 (*)     Hemoglobin 11.0 (*)     Hematocrit 33.9 (*)     MCV 95      MCH 30.7      MCHC 32.4      RDW 11.7      Platelets 288      Neutrophils % 75.0      Immature Granulocytes %, Automated 0.4      Lymphocytes % 12.8      Monocytes % 10.1      Eosinophils % 1.5      Basophils % 0.2      Neutrophils Absolute 9.74 (*)     Immature Granulocytes Absolute, Automated 0.05      Lymphocytes Absolute 1.67      Monocytes Absolute 1.32 (*)     Eosinophils Absolute 0.20      Basophils Absolute 0.03     BASIC METABOLIC PANEL - Abnormal    Glucose 88      Sodium 141      Potassium 3.2 (*)     Chloride 101      Bicarbonate 28      Anion Gap 15      Urea Nitrogen 7      Creatinine 0.68      eGFR >90      Calcium 9.6     SEDIMENTATION RATE, AUTOMATED - Abnormal    Sedimentation Rate 69 (*)    C-REACTIVE PROTEIN - Abnormal    C-Reactive Protein 9.56 (*)    LACTATE       Past History   Medical History[1]  Surgical History[2]  Social  "History[3]      Medications/Allergies     Previous Medications    ACETAMINOPHEN (TYLENOL) 325 MG TABLET    Take 2 tablets (650 mg) by mouth every 6 hours if needed for mild pain (1 - 3) or headaches.    AMLODIPINE (NORVASC) 10 MG TABLET    Take 1 tablet (10 mg) by mouth once daily.     Allergies[4]      Review of Systems  All systems reviewed and otherwise negative, except as stated above in HPI.      Physical Exam     Visit Vitals  /89   Pulse 91   Temp 36.8 °C (98.2 °F)   Resp 16   Ht 1.702 m (5' 7\")   Wt 68 kg (150 lb)   SpO2 100%   BMI 23.49 kg/m²   BSA 1.79 m²         Physical Exam:  Appearance: Alert, oriented , cooperative,  in no acute distress. Well nourished & well hydrated.  Eyes:  EOMs intact,  Conjunctiva pink with no redness or exudates. No scleral icterus.   ENT: Hearing grossly intact.  Pharynx clear, uvula midline.   Neck: Supple,Trachea at midline. No lymphadenopathy.  Pulmonary: Clear bilaterally with good chest wall excursion. No rales, rhonchi or wheezing. No accessory muscle use or stridor.  Cardiac: Normal S1, S2 without murmur, rub, gallop or extrasystole.   Abdomen: Soft, nontender.  No palpable organomegaly.  No rebound or guarding.    Musculoskeletal: Edema of left foot and ankle. Erythema and warmth. Full ROM with sensation intact. No open wounds. Mildly TTP. No edema or skin changes noted over calf or anterior lower leg.   Neurological:  Cranial nerves II through XII are grossly intact, normal sensation, no weakness, no focal findings identified.  Psychiatric: Appropriate mood and affect.       Consultants  1) none      Impression and Plan  In summary, Keith Krueger is admitted to the [facility] Clinical Decision Unit for cellulitis of left foot. Dr. Giron is the CDU admission attending.    This patient has been risk-stratified based on available history, physical exam, and related study findings. Admission to the observation status for further " diagnosis/treatment/monitoring of cellulitis of left foot is warranted clinically. This extended period of observation is specifically required to determine the need for hospitalization.     The goals of this admission based on the patient’s clinical problem list are:  1) stable vital signs  2) symptom control  3) continue IV antibiotics    We will observe the patient for the following endpoints:   1) stable vital signs  2) symptom improvement  3) no progression of cellulitis    When met, appropriate disposition will be arranged.            [1] No past medical history on file.  [2] No past surgical history on file.  [3]   Social History  Socioeconomic History    Marital status: Single   [4] No Known Allergies

## 2025-04-20 NOTE — ED PROVIDER NOTES
HPI   Chief Complaint   Patient presents with    Foot Injury     Right foot swelling and redness that began yesterday.  No injury.       54-year-old male PMH alcohol abuse, HTN presents emergency department for chief complaint of atraumatic left foot pain.  Patient reports that he began noticing pain, swelling, redness in the left foot yesterday.  Denies any recent trauma, falls, lacerations or cuts to the skin.  Patient reports that he is not diabetic.  Denies any constitutional symptoms.  No other complaints.              Patient History   Medical History[1]  Surgical History[2]  Family History[3]  Social History[4]    Physical Exam   ED Triage Vitals [04/19/25 2327]   Temperature Heart Rate Respirations BP   36.8 °C (98.2 °F) (!) 109 16 155/89      Pulse Ox Temp src Heart Rate Source Patient Position   98 % -- -- --      BP Location FiO2 (%)     -- --       Physical Exam  Vitals and nursing note reviewed.   Constitutional:       General: He is not in acute distress.     Appearance: Normal appearance. He is normal weight. He is not ill-appearing or toxic-appearing.   HENT:      Head: Normocephalic and atraumatic.   Cardiovascular:      Rate and Rhythm: Normal rate and regular rhythm.      Heart sounds: Normal heart sounds. No murmur heard.     No friction rub. No gallop.   Pulmonary:      Effort: Pulmonary effort is normal. No respiratory distress.      Breath sounds: Normal breath sounds. No stridor. No wheezing, rhonchi or rales.   Musculoskeletal:      Comments: Diffuse marked edema throughout L foot. Erythema. Mildly TTP. N/V intact. Full ROM throughout extremity. No visualized abscess or open areas of skin. No lymphatic streaking. Warm to touch.     Negative Homans' sign, no posterior left calf tenderness.  No palpable cord.  No edema to the left calf.   Neurological:      Mental Status: He is alert.           ED Course & MDM   ED Course as of 04/20/25 0135   Sat Apr 19, 2025   [id number] Attending MDM:    Patient  is a 54-year-old male here for pain and swelling to his left lower extremity, primarily his ankle and foot.  Patient is not experiencing any pain in his foot, but is experiencing pain on the left lateral aspect of his ankle.  On examination he is well-appearing.  There is edema to the dorsum of the left foot with erythema both extend up into the ankle.  There is increased warmth as well.  Exam is most consistent with cellulitis.  Patient is not diabetic, therefore he will be covered with Ancef.    Maggi Canela MD   [BR]      ED Course User Index  [BR] Maggi Canela MD         Diagnoses as of 04/20/25 0135   Cellulitis of left foot                 No data recorded                                 Medical Decision Making  54-year-old male presents emergency department for chief complaint of left foot infection.  Patient reports that he began noticing this area yesterday.  Denies any recent trauma, falls or injury to this area as well.  On exam patient does have impressive edema to the left foot.  Obvious erythema and concern for infectious process at this time.  Will obtain imaging as well as labs here.  Patient will be started on Ancef for suspected cellulitis.  Neurovascularly intact at this time.  Full range of motion throughout joints and lower suspicion for septic joint at this time.    1:36 AM X-rays of the left foot and ankle negative for acute osseous abnormality. CBC does show mild leukocytosis at 13.0. BMP showing mild hypokalemia at 3.2 which is ordered to be replaced.  CRP elevated 9.56.  ESR elevated 69.  Revitalized here in the ED and heart rate now resting and around 90 bpm.  Patient does not meet SIRS criteria at this time.  However, given patient's level of swelling and presentation today believe he will benefit from 24 hours of observation and continued IV antibiotics.  Spoke with patient regarding this and he was amenable to staying here in the hospital.  Did speak to CDU provider  regarding this patient and they did accept for further monitoring and management.  Current plan is to receive 2 g IV Ancef 3 times daily and reevaluate.    Admitted to CDU for further management monitoring.            Procedure  Procedures         [1] No past medical history on file.  [2] No past surgical history on file.  [3] No family history on file.  [4]   Social History  Tobacco Use    Smoking status: Not on file    Smokeless tobacco: Not on file   Substance Use Topics    Alcohol use: Not on file    Drug use: Not on file        Cristian Sy PA-C  04/20/25 0233

## 2025-04-20 NOTE — PROGRESS NOTES
Subjective  Patient has been admitted to the CDU for 16 hours. Serial assessments of patient's clinical progress include:  1) VSS  2) Patient reports improvement in his left foot pain and swelling  3) Improving area of erythema to the left foot      Objective  Physical exam  VS: As documented in the triage note and EMR flowsheet from this visit were reviewed.  General: Patient is AAOx3, normally developed and nourished, is a good historian, answers questions appropriately  HEENT: head normocephalic, atraumatic  Neck: supple, full ROM, negative for lymphadenopathy, JVD, thyromegaly, tracheal deviation, nuchal rigidity  Pulmonary: Clear to auscultation bilaterally, No wheezing, rales, or rhonchi, no accessory muscle use, able to speak full clear sentences  Cardiac: Normal rate and rhythm, no murmurs, rubs or gallops  GI: soft, non-tender, non-distended, normoactive bowelsounds in all four quadrants, no masses or organomegaly, no guarding or CVA tenderness noted  Musculoskeletal: Edema of left foot and ankle. Erythema and warmth. Full ROM with sensation intact. No open wounds. Mildly TTP. No edema or skin changes noted over calf or anterior lower leg. full weight bearing, MORENO, no joint effusions or clubbing noted  Skin: Warm, dry, intact, no lesions or rashes noted, turgor is good.  Neuro: patient follow commands, cranial nerves 2-12 grossly intact. No focal deficits.  Psych: Appropriate mood and affect for situation    Labs Reviewed   CBC WITH AUTO DIFFERENTIAL - Abnormal       Result Value    WBC 13.0 (*)     nRBC 0.0      RBC 3.58 (*)     Hemoglobin 11.0 (*)     Hematocrit 33.9 (*)     MCV 95      MCH 30.7      MCHC 32.4      RDW 11.7      Platelets 288      Neutrophils % 75.0      Immature Granulocytes %, Automated 0.4      Lymphocytes % 12.8      Monocytes % 10.1      Eosinophils % 1.5      Basophils % 0.2      Neutrophils Absolute 9.74 (*)     Immature Granulocytes Absolute, Automated 0.05      Lymphocytes  Absolute 1.67      Monocytes Absolute 1.32 (*)     Eosinophils Absolute 0.20      Basophils Absolute 0.03     BASIC METABOLIC PANEL - Abnormal    Glucose 88      Sodium 141      Potassium 3.2 (*)     Chloride 101      Bicarbonate 28      Anion Gap 15      Urea Nitrogen 7      Creatinine 0.68      eGFR >90      Calcium 9.6     SEDIMENTATION RATE, AUTOMATED - Abnormal    Sedimentation Rate 69 (*)    C-REACTIVE PROTEIN - Abnormal    C-Reactive Protein 9.56 (*)    LACTATE - Normal    Lactate 1.2      Narrative:     Venipuncture immediately after or during the administration of Metamizole may lead to falsely low results. Testing should be performed immediately prior to Metamizole dosing.        XR foot left 3+ views   Final Result   1. No acute osseous abnormality.   2. Nonspecific diffuse soft tissue edema.             I personally reviewed the images/study and I agree with the findings   as stated. This study was interpreted at [place], [place].        MACRO:   None        Signed by: Carol Bowman 4/20/2025 1:37 AM   Dictation workstation:   [id number]      XR ankle left 3+ views   Final Result   1. No acute osseous abnormality.   2. Nonspecific diffuse soft tissue edema.             I personally reviewed the images/study and I agree with the findings   as stated. This study was interpreted at [place], [place].        MACRO:   None        Signed by: Carol Bowman 4/20/2025 1:37 AM   Dictation workstation:   [id number]           Assessment/Plan  Patient continues to be managed in accordance with the CDU clinical guidelines for cellulitis of the left foot. An update of their clinical problem list included:     1) Cellulitis, left foot  -Ancef every 8 hours  - IV/oral analgesics as needed  - Reassess    Cecil Quigley, PAC  [facility]  Emergency Dept

## 2025-04-21 VITALS
BODY MASS INDEX: 23.54 KG/M2 | TEMPERATURE: 98.2 F | OXYGEN SATURATION: 98 % | RESPIRATION RATE: 16 BRPM | DIASTOLIC BLOOD PRESSURE: 89 MMHG | WEIGHT: 150 LBS | SYSTOLIC BLOOD PRESSURE: 144 MMHG | HEART RATE: 82 BPM | HEIGHT: 67 IN

## 2025-04-21 PROCEDURE — G0378 HOSPITAL OBSERVATION PER HR: HCPCS

## 2025-04-21 PROCEDURE — 99238 HOSP IP/OBS DSCHRG MGMT 30/<: CPT | Performed by: NURSE PRACTITIONER

## 2025-04-21 PROCEDURE — 2500000004 HC RX 250 GENERAL PHARMACY W/ HCPCS (ALT 636 FOR OP/ED): Mod: SE

## 2025-04-21 PROCEDURE — 2500000001 HC RX 250 WO HCPCS SELF ADMINISTERED DRUGS (ALT 637 FOR MEDICARE OP): Mod: SE | Performed by: STUDENT IN AN ORGANIZED HEALTH CARE EDUCATION/TRAINING PROGRAM

## 2025-04-21 PROCEDURE — 2500000002 HC RX 250 W HCPCS SELF ADMINISTERED DRUGS (ALT 637 FOR MEDICARE OP, ALT 636 FOR OP/ED): Mod: SE | Performed by: PHYSICIAN ASSISTANT

## 2025-04-21 PROCEDURE — 2500000001 HC RX 250 WO HCPCS SELF ADMINISTERED DRUGS (ALT 637 FOR MEDICARE OP): Mod: SE

## 2025-04-21 PROCEDURE — 96366 THER/PROPH/DIAG IV INF ADDON: CPT

## 2025-04-21 RX ORDER — CEPHALEXIN 500 MG/1
500 CAPSULE ORAL ONCE
Status: COMPLETED | OUTPATIENT
Start: 2025-04-21 | End: 2025-04-21

## 2025-04-21 RX ORDER — IBUPROFEN 600 MG/1
600 TABLET ORAL EVERY 6 HOURS PRN
Qty: 20 TABLET | Refills: 0 | Status: SHIPPED | OUTPATIENT
Start: 2025-04-21 | End: 2025-04-26

## 2025-04-21 RX ADMIN — FLUTICASONE PROPIONATE 1 SPRAY: 50 SPRAY, METERED NASAL at 09:06

## 2025-04-21 RX ADMIN — CEPHALEXIN 500 MG: 500 CAPSULE ORAL at 06:41

## 2025-04-21 RX ADMIN — CEFAZOLIN SODIUM 2 G: 2 INJECTION, SOLUTION INTRAVENOUS at 00:42

## 2025-04-21 RX ADMIN — LOSARTAN POTASSIUM 100 MG: 50 TABLET, FILM COATED ORAL at 09:06

## 2025-04-21 ASSESSMENT — PAIN DESCRIPTION - PAIN TYPE: TYPE: ACUTE PAIN

## 2025-04-21 ASSESSMENT — PAIN SCALES - GENERAL: PAINLEVEL_OUTOF10: 3

## 2025-04-21 ASSESSMENT — PAIN DESCRIPTION - LOCATION: LOCATION: FOOT

## 2025-04-21 ASSESSMENT — PAIN - FUNCTIONAL ASSESSMENT: PAIN_FUNCTIONAL_ASSESSMENT: 0-10

## 2025-04-21 NOTE — DISCHARGE INSTRUCTIONS
Take Keflex 4 times a day for 7 days.  Take ibuprofen as needed for pain management.  Follow-up with primary care provider as soon as possible.

## 2025-04-21 NOTE — PROGRESS NOTES
"Progress Note  Clinical Decision Unit  Patient: Keith Krueger  MRN: [id number]  : 1970    Subjective  Keith Krueger has been admitted to the CDU for 30 hours. Serial assessments of Keith Krueger's clinical progress include:  1) no acute complaints  2) left foot appears improved compared to yesterday, swelling and erythema has decreased.  Patient able to ambulate, normal ROM of left ankle and digits  3) vital signs remained stable  4) Stop Ancef and start first dose of PO Keflex     Objective  Last Recorded Vitals  Blood pressure 136/87, pulse 88, temperature 36.8 °C (98.2 °F), resp. rate 17, height 1.702 m (5' 7\"), weight 68 kg (150 lb), SpO2 98%.  Physical Exam  Physical Exam  Physical Exam:  Appearance: Alert, oriented , cooperative,  in no acute distress. Well nourished & well hydrated.  Eyes:  EOMs intact,  Conjunctiva pink with no redness or exudates. No scleral icterus.   ENT: Hearing grossly intact.  Pharynx clear, uvula midline.   Neck: Supple,Trachea at midline. No lymphadenopathy.  Pulmonary: Clear bilaterally with good chest wall excursion. No rales, rhonchi or wheezing. No accessory muscle use or stridor.  Cardiac: Normal S1, S2 without murmur, rub, gallop or extrasystole.   Abdomen: Soft, nontender.  No palpable organomegaly.  No rebound or guarding.    Musculoskeletal: Mild Edema of left foot and ankle. Slight Erythema. Full ROM with sensation intact. No open wounds. Mildly TTP. No edema or skin changes noted over calf or anterior lower leg.   Neurological:  Cranial nerves II through XII are grossly intact, normal sensation, no weakness, no focal findings identified.  Psychiatric: Appropriate mood and affect.     Relevant Results  Imaging  XR foot left 3+ views   Final Result   1. No acute osseous abnormality.   2. Nonspecific diffuse soft tissue edema.             I personally reviewed the images/study and I agree with the findings   as stated. This study was interpreted at [place] " [place], [place].        MACRO:   None        Signed by: Carol Bowman 4/20/2025 1:37 AM   Dictation workstation:   [id number]      XR ankle left 3+ views   Final Result   1. No acute osseous abnormality.   2. Nonspecific diffuse soft tissue edema.             I personally reviewed the images/study and I agree with the findings   as stated. This study was interpreted at [place], [place].        MACRO:   None        Signed by: Carol Bowman 4/20/2025 1:37 AM   Dictation workstation:   [id number]           Cardiology, Vascular, and Other Imaging  No other imaging results found for the past 7 days    Results for orders placed or performed during the hospital encounter of 04/19/25 (from the past 24 hours)   CBC and Auto Differential   Result Value Ref Range    WBC 13.0 (H) 4.4 - 11.3 x10*3/uL    nRBC 0.0 0.0 - 0.0 /100 WBCs    RBC 3.58 (L) 4.50 - 5.90 x10*6/uL    Hemoglobin 11.0 (L) 13.5 - 17.5 g/dL    Hematocrit 33.9 (L) 41.0 - 52.0 %    MCV 95 80 - 100 fL    MCH 30.7 26.0 - 34.0 pg    MCHC 32.4 32.0 - 36.0 g/dL    RDW 11.7 11.5 - 14.5 %    Platelets 288 150 - 450 x10*3/uL    Neutrophils % 75.0 40.0 - 80.0 %    Immature Granulocytes %, Automated 0.4 0.0 - 0.9 %    Lymphocytes % 12.8 13.0 - 44.0 %    Monocytes % 10.1 2.0 - 10.0 %    Eosinophils % 1.5 0.0 - 6.0 %    Basophils % 0.2 0.0 - 2.0 %    Neutrophils Absolute 9.74 (H) 1.20 - 7.70 x10*3/uL    Immature Granulocytes Absolute, Automated 0.05 0.00 - 0.70 x10*3/uL    Lymphocytes Absolute 1.67 1.20 - 4.80 x10*3/uL    Monocytes Absolute 1.32 (H) 0.10 - 1.00 x10*3/uL    Eosinophils Absolute 0.20 0.00 - 0.70 x10*3/uL    Basophils Absolute 0.03 0.00 - 0.10 x10*3/uL   Basic metabolic panel   Result Value Ref Range    Glucose 88 74 - 99 mg/dL    Sodium 141 136 - 145 mmol/L    Potassium 3.2 (L) 3.5 - 5.3 mmol/L    Chloride 101 98 - 107 mmol/L    Bicarbonate 28 21 - 32 mmol/L    Anion Gap  15 10 - 20 mmol/L    Urea Nitrogen 7 6 - 23 mg/dL    Creatinine 0.68 0.50 - 1.30 mg/dL    eGFR >90 >60 mL/min/1.73m*2    Calcium 9.6 8.6 - 10.6 mg/dL   Sedimentation rate, automated   Result Value Ref Range    Sedimentation Rate 69 (H) 0 - 20 mm/h   C-reactive protein   Result Value Ref Range    C-Reactive Protein 9.56 (H) <1.00 mg/dL   SST TOP   Result Value Ref Range    Extra Tube Hold for add-ons.    Lactate   Result Value Ref Range    Lactate 1.2 0.4 - 2.0 mmol/L       Scheduled medications  Scheduled Medications[1]  Continuous medications  Continuous Medications[2]  PRN medications  PRN Medications[3]    Assessment & Plan  Cellulitis of left foot    Hypokalemia      Assessment/Plan  Keith Krueger continues to be managed in accordance with the CDU clinical guidelines for Cellulitis of left foot. An update of their clinical problem list included:   1) cellulitis of left foot  -- Ancef every 8 hours  -- As needed analgesics  -- Discussed discharge home with patient, states he prefers to stay another night and discharge in the morning due to it being Easter and does not feel safe going home late at night.  He has agreed to be discharged in the morning with Keflex sent to pharmacy.  Patient to follow-up with primary care        Pat Covarrubias PA-C  Emergency Medicine/Clinical Decision Unit   [facility]   Available via Secure Chat            [1] ceFAZolin, 2 g, intravenous, q8h  fluticasone, 1 spray, Each Nostril, Daily  losartan, 100 mg, oral, Daily  [2]    [3] PRN medications: acetaminophen, ibuprofen, polyethylene glycol

## 2025-04-21 NOTE — DISCHARGE SUMMARY
Disposition Note  [facility] CLINICAL DECISION  Patient: Keith Krueger  MRN: [id number]  : 1970  Date of Evaluation: 2025  ED Provider: CHUY Sanders DNP      Limitations to history: None  Independent Historian: Yes  External Records Reviewed: EMR       Subjective:    Keith Krueger is a 54 y.o. male has undergone comprehensive diagnostic evaluation and therapeutic management in accordance with the CDU guidelines for left foot cellulitis. Based on the patient's clinical response and diagnostic information during this period of observation, it has been determined that the patient will be discharged.     The acute evaluation included:  Orders Placed This Encounter   Procedures    XR foot left 3+ views    XR ankle left 3+ views    CBC and Auto Differential    Basic metabolic panel    Sedimentation rate, automated    C-reactive protein    Extra Tubes    SST TOP    Lactate    Referral to Primary Care    Adult diet Regular    Vital Signs    Activity (specify) No Restrictions    No telemetry or cardiac monitoring required    Full code    Electrocardiogram, 12-lead PRN ACS symptoms    Insert and maintain peripheral IV    Initiate Observation Send to CDU         Placed in observation at:        Past History   Medical History[1]  Surgical History[2]  Social History[3]      Medications/Allergies     Previous Medications    ACETAMINOPHEN (TYLENOL) 325 MG TABLET    Take 2 tablets (650 mg) by mouth every 6 hours if needed for mild pain (1 - 3) or headaches.    AMLODIPINE (NORVASC) 10 MG TABLET    Take 1 tablet (10 mg) by mouth once daily.     Allergies[4]      Review of Systems  All systems reviewed and otherwise negative, except as stated above in HPI.    Diagnostics reviewed by CHUY Sanders DNP     Labs:  Results for orders placed or performed during the hospital encounter of 25   CBC and Auto Differential    Collection Time: 25 12:49 AM   Result  Value Ref Range    WBC 13.0 (H) 4.4 - 11.3 x10*3/uL    nRBC 0.0 0.0 - 0.0 /100 WBCs    RBC 3.58 (L) 4.50 - 5.90 x10*6/uL    Hemoglobin 11.0 (L) 13.5 - 17.5 g/dL    Hematocrit 33.9 (L) 41.0 - 52.0 %    MCV 95 80 - 100 fL    MCH 30.7 26.0 - 34.0 pg    MCHC 32.4 32.0 - 36.0 g/dL    RDW 11.7 11.5 - 14.5 %    Platelets 288 150 - 450 x10*3/uL    Neutrophils % 75.0 40.0 - 80.0 %    Immature Granulocytes %, Automated 0.4 0.0 - 0.9 %    Lymphocytes % 12.8 13.0 - 44.0 %    Monocytes % 10.1 2.0 - 10.0 %    Eosinophils % 1.5 0.0 - 6.0 %    Basophils % 0.2 0.0 - 2.0 %    Neutrophils Absolute 9.74 (H) 1.20 - 7.70 x10*3/uL    Immature Granulocytes Absolute, Automated 0.05 0.00 - 0.70 x10*3/uL    Lymphocytes Absolute 1.67 1.20 - 4.80 x10*3/uL    Monocytes Absolute 1.32 (H) 0.10 - 1.00 x10*3/uL    Eosinophils Absolute 0.20 0.00 - 0.70 x10*3/uL    Basophils Absolute 0.03 0.00 - 0.10 x10*3/uL   Basic metabolic panel    Collection Time: 04/20/25 12:49 AM   Result Value Ref Range    Glucose 88 74 - 99 mg/dL    Sodium 141 136 - 145 mmol/L    Potassium 3.2 (L) 3.5 - 5.3 mmol/L    Chloride 101 98 - 107 mmol/L    Bicarbonate 28 21 - 32 mmol/L    Anion Gap 15 10 - 20 mmol/L    Urea Nitrogen 7 6 - 23 mg/dL    Creatinine 0.68 0.50 - 1.30 mg/dL    eGFR >90 >60 mL/min/1.73m*2    Calcium 9.6 8.6 - 10.6 mg/dL   Sedimentation rate, automated    Collection Time: 04/20/25 12:49 AM   Result Value Ref Range    Sedimentation Rate 69 (H) 0 - 20 mm/h   C-reactive protein    Collection Time: 04/20/25 12:49 AM   Result Value Ref Range    C-Reactive Protein 9.56 (H) <1.00 mg/dL   SST TOP    Collection Time: 04/20/25 12:49 AM   Result Value Ref Range    Extra Tube Hold for add-ons.    Lactate    Collection Time: 04/20/25  2:25 AM   Result Value Ref Range    Lactate 1.2 0.4 - 2.0 mmol/L     Radiographs:  XR foot left 3+ views   Final Result   1. No acute osseous abnormality.   2. Nonspecific diffuse soft tissue edema.             I personally reviewed the  "images/study and I agree with the findings   as stated. This study was interpreted at [place], [place].        MACRO:   None        Signed by: Carol Bowman 4/20/2025 1:37 AM   Dictation workstation:   [id number]      XR ankle left 3+ views   Final Result   1. No acute osseous abnormality.   2. Nonspecific diffuse soft tissue edema.             I personally reviewed the images/study and I agree with the findings   as stated. This study was interpreted at [place], [place].        MACRO:   None        Signed by: Carol Bowman 4/20/2025 1:37 AM   Dictation workstation:   [id number]              Physical Exam     Visit Vitals  /89 (BP Location: Right arm, Patient Position: Lying)   Pulse 64   Temp 36.2 °C (97.2 °F) (Temporal)   Resp 14   Ht 1.702 m (5' 7\")   Wt 68 kg (150 lb)   SpO2 98%   BMI 23.49 kg/m²   BSA 1.79 m²       GENERAL:  The patient appears nourished and normally developed. Vital signs as documented.     HEENT:  Head normocephalic, atraumatic, EOMs intact, PERRLA, Mucous membranes moist. Nares patent without copious rhinorrhea.  No lymphadenopathy.    PULMONARY:  Lungs are clear to auscultation, without any respiratory distress. Able to speak full sentences, no accessory muscle use    CARDIAC:   Normal rate. No murmurs, rubs or gallops    ABDOMEN:  Soft, non-distended, non-tender, BS positive x 4 quadrants, No rebound or guarding, no peritoneal signs, no CVA tenderness, no masses or organomegaly    MUSCULOSKELETAL:   Able to ambulate, Non edematous, with no obvious deformities. Pulses intact distal   Mild Edema of left foot and ankle. Slight Erythema. Full ROM with sensation intact. No open wounds. Mildly TTP. No edema or skin changes noted over calf or anterior lower leg.         SKIN:   Good color, with no significant rashes.  No pallor.    NEURO:  No obvious neurological deficits, normal sensation " and strength bilaterally.  Able to follow commands, NIH 0, CN 2-12 intact.    Psych: Appropriate mood and affect    Consultants  1) none       Impression and Plan    In summary, Keith Krueger has been cared for according to the standard [facility] Clinical Decision Unit observation protocol for Cellulitis of left foot. This extended period of observation was specifically required to determine the need for hospitalization. Prior to discharge from observation, the final physical exam is documented above.     Significant events during the course of observation based on the goals of the clinical problem list include:   1) left foot cellulitis   Received IV antibiotics and was transitioned to PO and discharged        Based on the patient's condition and test results, the patient will be discharged.    Total length of observation was 30 hours. Dr. Beltrán the CDU disposition attending.      Discharge Diagnosis  Cellulitis of left foot    Issues Requiring Follow-Up  Health maintenance  Cellulitis      Discharge Meds     Your medication list        START taking these medications        Instructions Last Dose Given Next Dose Due   cephalexin 500 mg capsule  Commonly known as: Keflex      Take 1 capsule (500 mg) by mouth 4 times a day for 7 days.       ibuprofen 600 mg tablet      Take 1 tablet (600 mg) by mouth every 6 hours if needed for mild pain (1 - 3) for up to 5 days.              ASK your doctor about these medications        Instructions Last Dose Given Next Dose Due   acetaminophen 325 mg tablet  Commonly known as: TylenoL      Take 2 tablets (650 mg) by mouth every 6 hours if needed for mild pain (1 - 3) or headaches.       amLODIPine 10 mg tablet  Commonly known as: Norvasc      Take 1 tablet (10 mg) by mouth once daily.                 Where to Get Your Medications        These medications were sent to [facility]/pharmacy #[id number] - [place] [address] [address] AT [place] [address]  [address], [facility] [address]      Phone: [phone]   cephalexin 500 mg capsule  ibuprofen 600 mg tablet         Test Results Pending At Discharge  Pending Labs       No current pending labs.            CDU COURSE:  This is a 54 year old male who was admitted to the CDU for left foot cellulitis. He received IV antibiotics while in the CDU with stable vitals. He was discharged by the previous provider whom sent his prescriptions to his pharmacy as well.     Outpatient Follow-Up  Future Appointments   Date Time Provider Department Center   8/6/2025  9:45 AM Miguel Viveros MD [id number]         Rosio Bah, APRN-CNP, DNP                 [1] No past medical history on file.  [2] No past surgical history on file.  [3]   Social History  Socioeconomic History    Marital status: Single   [4] No Known Allergies

## 2025-05-14 ENCOUNTER — APPOINTMENT (OUTPATIENT)
Facility: CLINIC | Age: 55
End: 2025-05-14
Payer: COMMERCIAL

## 2025-07-07 ENCOUNTER — CLINICAL SUPPORT (OUTPATIENT)
Dept: EMERGENCY MEDICINE | Facility: HOSPITAL | Age: 55
End: 2025-07-07
Payer: COMMERCIAL

## 2025-07-07 ENCOUNTER — HOSPITAL ENCOUNTER (EMERGENCY)
Facility: HOSPITAL | Age: 55
Discharge: HOME | End: 2025-07-07
Attending: EMERGENCY MEDICINE
Payer: COMMERCIAL

## 2025-07-07 VITALS
RESPIRATION RATE: 16 BRPM | HEART RATE: 99 BPM | DIASTOLIC BLOOD PRESSURE: 90 MMHG | OXYGEN SATURATION: 94 % | SYSTOLIC BLOOD PRESSURE: 145 MMHG | HEIGHT: 67 IN | TEMPERATURE: 98.2 F | BODY MASS INDEX: 23.54 KG/M2 | WEIGHT: 150 LBS

## 2025-07-07 DIAGNOSIS — Z76.0 ENCOUNTER FOR MEDICATION REFILL: Primary | ICD-10-CM

## 2025-07-07 DIAGNOSIS — I10 HYPERTENSION, UNSPECIFIED TYPE: ICD-10-CM

## 2025-07-07 DIAGNOSIS — R51.9 NONINTRACTABLE HEADACHE, UNSPECIFIED CHRONICITY PATTERN, UNSPECIFIED HEADACHE TYPE: ICD-10-CM

## 2025-07-07 LAB
ATRIAL RATE: 113 BPM
P AXIS: 61 DEGREES
P OFFSET: 191 MS
P ONSET: 139 MS
PR INTERVAL: 170 MS
Q ONSET: 224 MS
QRS COUNT: 18 BEATS
QRS DURATION: 68 MS
QT INTERVAL: 306 MS
QTC CALCULATION(BAZETT): 419 MS
QTC FREDERICIA: 377 MS
R AXIS: 75 DEGREES
T AXIS: 35 DEGREES
T OFFSET: 377 MS
VENTRICULAR RATE: 113 BPM

## 2025-07-07 PROCEDURE — 99283 EMERGENCY DEPT VISIT LOW MDM: CPT | Performed by: EMERGENCY MEDICINE

## 2025-07-07 PROCEDURE — 2500000001 HC RX 250 WO HCPCS SELF ADMINISTERED DRUGS (ALT 637 FOR MEDICARE OP): Mod: SE

## 2025-07-07 PROCEDURE — 99284 EMERGENCY DEPT VISIT MOD MDM: CPT | Performed by: EMERGENCY MEDICINE

## 2025-07-07 PROCEDURE — 93010 ELECTROCARDIOGRAM REPORT: CPT | Performed by: EMERGENCY MEDICINE

## 2025-07-07 PROCEDURE — 93005 ELECTROCARDIOGRAM TRACING: CPT

## 2025-07-07 RX ORDER — AMLODIPINE BESYLATE 10 MG/1
10 TABLET ORAL DAILY
Qty: 30 TABLET | Refills: 0 | Status: SHIPPED | OUTPATIENT
Start: 2025-07-07 | End: 2025-07-09 | Stop reason: WASHOUT

## 2025-07-07 RX ORDER — ACETAMINOPHEN 325 MG/1
650 TABLET ORAL EVERY 6 HOURS PRN
Qty: 30 TABLET | Refills: 0 | Status: SHIPPED | OUTPATIENT
Start: 2025-07-07

## 2025-07-07 RX ORDER — ACETAMINOPHEN 325 MG/1
975 TABLET ORAL ONCE
Status: COMPLETED | OUTPATIENT
Start: 2025-07-07 | End: 2025-07-07

## 2025-07-07 RX ADMIN — ACETAMINOPHEN 975 MG: 325 TABLET ORAL at 03:50

## 2025-07-07 ASSESSMENT — LIFESTYLE VARIABLES
TREMOR: NO TREMOR
ANXIETY: NO ANXIETY, AT EASE
PAROXYSMAL SWEATS: NO SWEAT VISIBLE
TOTAL SCORE: 2
AGITATION: NORMAL ACTIVITY
EVER HAD A DRINK FIRST THING IN THE MORNING TO STEADY YOUR NERVES TO GET RID OF A HANGOVER: NO
HAVE PEOPLE ANNOYED YOU BY CRITICIZING YOUR DRINKING: YES
HAVE YOU EVER FELT YOU SHOULD CUT DOWN ON YOUR DRINKING: YES
ORIENTATION AND CLOUDING OF SENSORIUM: ORIENTED AND CAN DO SERIAL ADDITIONS
AUDITORY DISTURBANCES: NOT PRESENT
VISUAL DISTURBANCES: NOT PRESENT
EVER FELT BAD OR GUILTY ABOUT YOUR DRINKING: NO
NAUSEA AND VOMITING: NO NAUSEA AND NO VOMITING
PULSE: 103
TOTAL SCORE: 0
HEADACHE, FULLNESS IN HEAD: NOT PRESENT

## 2025-07-07 ASSESSMENT — PAIN SCALES - GENERAL
PAINLEVEL_OUTOF10: 8
PAINLEVEL_OUTOF10: 8
PAINLEVEL_OUTOF10: 6
PAINLEVEL_OUTOF10: 5 - MODERATE PAIN

## 2025-07-07 ASSESSMENT — PAIN - FUNCTIONAL ASSESSMENT
PAIN_FUNCTIONAL_ASSESSMENT: 0-10

## 2025-07-07 ASSESSMENT — PAIN DESCRIPTION - PAIN TYPE: TYPE: CHRONIC PAIN

## 2025-07-07 ASSESSMENT — PAIN DESCRIPTION - DESCRIPTORS
DESCRIPTORS: DISCOMFORT
DESCRIPTORS: DISCOMFORT

## 2025-07-07 ASSESSMENT — PAIN DESCRIPTION - ORIENTATION: ORIENTATION: RIGHT;LEFT

## 2025-07-07 ASSESSMENT — PAIN DESCRIPTION - PROGRESSION: CLINICAL_PROGRESSION: NOT CHANGED

## 2025-07-07 ASSESSMENT — PAIN DESCRIPTION - LOCATION: LOCATION: FOOT

## 2025-07-07 NOTE — ED PROVIDER NOTES
"History of Present Illness     History provided by: Patient  Limitations to History: None  External Records Reviewed with Brief Summary: Previous ED visits for medication refills    HPI:  Keith Krueger is a 54 y.o. male with history of hypertension and gout who presents to the ED because he was concerned that his blood pressure was high.  He reports that he has not taken his blood pressure medicine in a few days, because he \"keeps forgetting.\"  He stated that he needs refills of his blood pressure medicine.  He denies any headache, dizziness, vision changes, neck pain, chest pain, shortness of breath, nausea, vomiting, abdominal pain, changes in urination or bowel movements, numbness, tingling, fever, chills, or sick contacts.  He also reports that he has chronic left low back pain and states that it has been hurting \"for years\" which is unchanged but he has not been taking any medications for this. He denies any changes to his chronic pain. No difficulties ambulating. No incontinence.     Physical Exam   Triage vitals:  T 36.9 °C (98.4 °F)  HR (!) 122  BP (!) 194/115  RR 16  O2 95 % None (Room air)    General: Awake, alert, oriented, in no acute distress  Eyes: Gaze conjugate.  No scleral icterus or injection. EOMI. PERRL.  HENT: Normo-cephalic, atraumatic. No stridor. Mucous membranes moist  Neck: supple, full ROM intact.   CV: Regular rate, regular rhythm. Radial pulses 2+ bilaterally  Resp: Breathing non-labored, speaking in full sentences.  Clear to auscultation bilaterally  GI: Soft, non-distended, non-tender. No rebound or guarding. No peritoneal signs.   MSK/Extremities: No gross bony deformities. Moving all extremities with good tone and ROM intact. No edema. Neurovascularly intact throughout.  Back: no midline tenderness, no step-offs or deformities.   Skin: Warm. Appropriate color. Intact. No rashes.   Neuro: Alert. Oriented. Face symmetric. Speech is fluent. Cranial nerves 2-12 intact. Gross 5/5 " "strength and sensation intact in b/l UE and LEs.  Ambulating appropriately and independently.  Psych: Appropriate mood and affect    Medical Decision Making & ED Course   Medical Decision Makin y.o. male with above past medical history presenting to the ED with concern for high blood pressure.  He stated that he needs refills of his high blood pressure medicine.  He is endorsing left lower back pain that he reports is chronic for him \"for years\", but he does not have any medicine at home for this. No trauma, falls, or injuries. No changes to his chronic pain. He denies any headache, dizziness, chest pain, shortness of breath, nausea, incontinence, numbness, or tingling. Patient did not want a workup at this time and declined any labwork or imaging. Patient reported that he just wanted medicine refills.  He was given some Tylenol for his chronic pain.  He was given refills of amlodipine and Tylenol that were sent to his pharmacy. His initial vitals in triage showed elevated BP of 194/115 and tachycardia with HR of 122 but repeat vitals without any intervention improved on repeat to BP of 145/90 and HR of 99. Patient is afebrile. The patient is in no respiratory distress, satting well on room air. Patient is neurologically and neurovascularly intact. He reports that he feels comfortable going home. The patient was instructed of supportive measures and to follow-up with a primary care physician. Return precautions were provided, for which the patient expressed understanding. The patient was discharged home in stable condition. They should feel free to return to the Emergency Department at any time should their condition worsen or should they have any questions or concerns.     ED Course:  Diagnoses as of 25 [id number]   Encounter for medication refill       EKG Independent Interpretation: EKG obtained at 1:07 AM showed sinus rhythm with ventricular rate of 113.  Intervals normal, axis normal.  No ST elevations.  " Grossly unchanged from previous EKG on December 20, 2023.  ----     Social Determinants of Health which Significantly Impact Care: None identified     The patient was discussed with the following consultants/services: None      Disposition   As a result of the work-up, the patient was discharged home.  he was informed of his diagnosis and instructed to come back with any concerns or worsening of condition.  he and was agreeable to the plan as discussed above.  he was given the opportunity to ask questions.  All of the patient's questions were answered.    Procedures   Procedures    Patient seen and discussed with ED attending physician.    Moira Bravo DO  Emergency Medicine     Moira Bravo DO  Resident  07/08/25 [id number]       Odalys Tom MD  07/08/25 [id number]

## 2025-07-07 NOTE — DISCHARGE INSTRUCTIONS
You were seen today in the ED for medication refills.  You are given a dose of Tylenol in the ED.  You were given refills of your Norvasc and Tylenol.  Please see your PCP for follow-up.  Please return to the ED for any worsening symptoms, including but not limited to chest pain, shortness of breath, or anything that is concerning to you.

## 2025-07-09 ENCOUNTER — HOSPITAL ENCOUNTER (EMERGENCY)
Facility: HOSPITAL | Age: 55
Discharge: HOME | End: 2025-07-09
Attending: EMERGENCY MEDICINE
Payer: COMMERCIAL

## 2025-07-09 ENCOUNTER — CLINICAL SUPPORT (OUTPATIENT)
Dept: EMERGENCY MEDICINE | Facility: HOSPITAL | Age: 55
End: 2025-07-09
Payer: COMMERCIAL

## 2025-07-09 ENCOUNTER — APPOINTMENT (OUTPATIENT)
Dept: RADIOLOGY | Facility: HOSPITAL | Age: 55
End: 2025-07-09
Payer: COMMERCIAL

## 2025-07-09 VITALS
DIASTOLIC BLOOD PRESSURE: 99 MMHG | TEMPERATURE: 97.4 F | HEART RATE: 71 BPM | SYSTOLIC BLOOD PRESSURE: 146 MMHG | WEIGHT: 150 LBS | BODY MASS INDEX: 23.54 KG/M2 | OXYGEN SATURATION: 97 % | HEIGHT: 67 IN | RESPIRATION RATE: 18 BRPM

## 2025-07-09 DIAGNOSIS — I10 HYPERTENSION, UNSPECIFIED TYPE: Primary | ICD-10-CM

## 2025-07-09 LAB
ALBUMIN SERPL BCP-MCNC: 5 G/DL (ref 3.4–5)
ALP SERPL-CCNC: 81 U/L (ref 33–120)
ALT SERPL W P-5'-P-CCNC: 22 U/L (ref 10–52)
ANION GAP SERPL CALC-SCNC: 19 MMOL/L (ref 10–20)
AST SERPL W P-5'-P-CCNC: 29 U/L (ref 9–39)
ATRIAL RATE: 94 BPM
BASOPHILS # BLD AUTO: 0.04 X10*3/UL (ref 0–0.1)
BASOPHILS NFR BLD AUTO: 0.7 %
BILIRUB SERPL-MCNC: 0.6 MG/DL (ref 0–1.2)
BNP SERPL-MCNC: 4 PG/ML (ref 0–99)
BUN SERPL-MCNC: 6 MG/DL (ref 6–23)
CALCIUM SERPL-MCNC: 9.3 MG/DL (ref 8.6–10.6)
CARDIAC TROPONIN I PNL SERPL HS: 12 NG/L (ref 0–53)
CHLORIDE SERPL-SCNC: 103 MMOL/L (ref 98–107)
CO2 SERPL-SCNC: 26 MMOL/L (ref 21–32)
CREAT SERPL-MCNC: 0.75 MG/DL (ref 0.5–1.3)
EGFRCR SERPLBLD CKD-EPI 2021: >90 ML/MIN/1.73M*2
EOSINOPHIL # BLD AUTO: 0.35 X10*3/UL (ref 0–0.7)
EOSINOPHIL NFR BLD AUTO: 6 %
ERYTHROCYTE [DISTWIDTH] IN BLOOD BY AUTOMATED COUNT: 11.4 % (ref 11.5–14.5)
GLUCOSE SERPL-MCNC: 108 MG/DL (ref 74–99)
HCT VFR BLD AUTO: 41.7 % (ref 41–52)
HGB BLD-MCNC: 14.1 G/DL (ref 13.5–17.5)
IMM GRANULOCYTES # BLD AUTO: 0.01 X10*3/UL (ref 0–0.7)
IMM GRANULOCYTES NFR BLD AUTO: 0.2 % (ref 0–0.9)
LYMPHOCYTES # BLD AUTO: 2.27 X10*3/UL (ref 1.2–4.8)
LYMPHOCYTES NFR BLD AUTO: 38.9 %
MCH RBC QN AUTO: 30 PG (ref 26–34)
MCHC RBC AUTO-ENTMCNC: 33.8 G/DL (ref 32–36)
MCV RBC AUTO: 89 FL (ref 80–100)
MONOCYTES # BLD AUTO: 0.42 X10*3/UL (ref 0.1–1)
MONOCYTES NFR BLD AUTO: 7.2 %
NEUTROPHILS # BLD AUTO: 2.74 X10*3/UL (ref 1.2–7.7)
NEUTROPHILS NFR BLD AUTO: 47 %
NRBC BLD-RTO: 0 /100 WBCS (ref 0–0)
P AXIS: 74 DEGREES
P OFFSET: 189 MS
P ONSET: 140 MS
PLATELET # BLD AUTO: 287 X10*3/UL (ref 150–450)
POTASSIUM SERPL-SCNC: 3.6 MMOL/L (ref 3.5–5.3)
PR INTERVAL: 166 MS
PROT SERPL-MCNC: 8.2 G/DL (ref 6.4–8.2)
Q ONSET: 223 MS
QRS COUNT: 15 BEATS
QRS DURATION: 80 MS
QT INTERVAL: 350 MS
QTC CALCULATION(BAZETT): 437 MS
QTC FREDERICIA: 406 MS
R AXIS: 78 DEGREES
RBC # BLD AUTO: 4.7 X10*6/UL (ref 4.5–5.9)
SODIUM SERPL-SCNC: 144 MMOL/L (ref 136–145)
T AXIS: 66 DEGREES
T OFFSET: 398 MS
VENTRICULAR RATE: 94 BPM
WBC # BLD AUTO: 5.8 X10*3/UL (ref 4.4–11.3)

## 2025-07-09 PROCEDURE — 70450 CT HEAD/BRAIN W/O DYE: CPT

## 2025-07-09 PROCEDURE — 36415 COLL VENOUS BLD VENIPUNCTURE: CPT

## 2025-07-09 PROCEDURE — 2500000001 HC RX 250 WO HCPCS SELF ADMINISTERED DRUGS (ALT 637 FOR MEDICARE OP): Mod: SE

## 2025-07-09 PROCEDURE — 83880 ASSAY OF NATRIURETIC PEPTIDE: CPT

## 2025-07-09 PROCEDURE — 99285 EMERGENCY DEPT VISIT HI MDM: CPT | Performed by: EMERGENCY MEDICINE

## 2025-07-09 PROCEDURE — 84484 ASSAY OF TROPONIN QUANT: CPT

## 2025-07-09 PROCEDURE — 70450 CT HEAD/BRAIN W/O DYE: CPT | Performed by: RADIOLOGY

## 2025-07-09 PROCEDURE — 80053 COMPREHEN METABOLIC PANEL: CPT

## 2025-07-09 PROCEDURE — 85025 COMPLETE CBC W/AUTO DIFF WBC: CPT

## 2025-07-09 PROCEDURE — 93005 ELECTROCARDIOGRAM TRACING: CPT

## 2025-07-09 PROCEDURE — 99285 EMERGENCY DEPT VISIT HI MDM: CPT | Mod: 25 | Performed by: EMERGENCY MEDICINE

## 2025-07-09 RX ORDER — AMLODIPINE BESYLATE 5 MG/1
10 TABLET ORAL DAILY
Status: DISCONTINUED | OUTPATIENT
Start: 2025-07-09 | End: 2025-07-09

## 2025-07-09 RX ORDER — AMLODIPINE BESYLATE 10 MG/1
10 TABLET ORAL DAILY
Qty: 30 TABLET | Refills: 0 | Status: SHIPPED | OUTPATIENT
Start: 2025-07-09 | End: 2025-08-08

## 2025-07-09 RX ORDER — AMLODIPINE BESYLATE 5 MG/1
10 TABLET ORAL ONCE
Status: COMPLETED | OUTPATIENT
Start: 2025-07-09 | End: 2025-07-09

## 2025-07-09 RX ADMIN — AMLODIPINE BESYLATE 10 MG: 5 TABLET ORAL at 04:41

## 2025-07-09 ASSESSMENT — LIFESTYLE VARIABLES
EVER HAD A DRINK FIRST THING IN THE MORNING TO STEADY YOUR NERVES TO GET RID OF A HANGOVER: NO
HAVE YOU EVER FELT YOU SHOULD CUT DOWN ON YOUR DRINKING: NO
HAVE PEOPLE ANNOYED YOU BY CRITICIZING YOUR DRINKING: NO
EVER FELT BAD OR GUILTY ABOUT YOUR DRINKING: NO
TOTAL SCORE: 0

## 2025-07-09 ASSESSMENT — PAIN DESCRIPTION - DESCRIPTORS: DESCRIPTORS: DISCOMFORT

## 2025-07-09 ASSESSMENT — PAIN - FUNCTIONAL ASSESSMENT: PAIN_FUNCTIONAL_ASSESSMENT: 0-10

## 2025-07-09 ASSESSMENT — PAIN SCALES - GENERAL: PAINLEVEL_OUTOF10: 8

## 2025-07-09 NOTE — ED TRIAGE NOTES
Pt came to ED with c/o high blood pressure meds. pt states he did not take his BP medications today and is endorsing a HA.

## 2025-07-25 ENCOUNTER — APPOINTMENT (OUTPATIENT)
Dept: PRIMARY CARE | Facility: CLINIC | Age: 55
End: 2025-07-25
Payer: COMMERCIAL

## 2025-08-06 ENCOUNTER — APPOINTMENT (OUTPATIENT)
Dept: DERMATOLOGY | Facility: CLINIC | Age: 55
End: 2025-08-06
Payer: COMMERCIAL

## 2025-08-06 DIAGNOSIS — L73.9 FOLLICULITIS: ICD-10-CM

## 2025-08-06 DIAGNOSIS — L72.9 FOLLICULAR CYST OF SKIN AND SUBCUTANEOUS TISSUE: Primary | ICD-10-CM

## 2025-08-06 PROCEDURE — 99203 OFFICE O/P NEW LOW 30 MIN: CPT | Performed by: STUDENT IN AN ORGANIZED HEALTH CARE EDUCATION/TRAINING PROGRAM

## 2025-08-06 RX ORDER — BENZOYL PEROXIDE 100 MG/ML
LIQUID TOPICAL DAILY
Qty: 148 ML | Refills: 11 | Status: SHIPPED | OUTPATIENT
Start: 2025-08-06 | End: 2026-08-06

## 2025-08-06 RX ORDER — CLINDAMYCIN PHOSPHATE 10 UG/ML
LOTION TOPICAL DAILY
Qty: 60 ML | Refills: 4 | Status: SHIPPED | OUTPATIENT
Start: 2025-08-06 | End: 2026-08-06

## 2025-08-06 ASSESSMENT — DERMATOLOGY PATIENT ASSESSMENT
DO YOU USE A TANNING BED: NO
HAVE YOU HAD OR DO YOU HAVE A STAPH INFECTION: NO
ARE YOU AN ORGAN TRANSPLANT RECIPIENT: NO
HAVE YOU HAD OR DO YOU HAVE VASCULAR DISEASE: NO
DO YOU HAVE ANY NEW OR CHANGING LESIONS: NO

## 2025-08-06 ASSESSMENT — ITCH NUMERIC RATING SCALE: HOW SEVERE IS YOUR ITCHING?: 6

## 2025-08-06 ASSESSMENT — PATIENT GLOBAL ASSESSMENT (PGA): PATIENT GLOBAL ASSESSMENT: PATIENT GLOBAL ASSESSMENT:  1 - CLEAR

## 2025-08-06 ASSESSMENT — DERMATOLOGY QUALITY OF LIFE (QOL) ASSESSMENT
RATE HOW BOTHERED YOU ARE BY SYMPTOMS OF YOUR SKIN PROBLEM (EG, ITCHING, STINGING BURNING, HURTING OR SKIN IRRITATION): 0 - NEVER BOTHERED
WHAT SINGLE SKIN CONDITION LISTED BELOW IS THE PATIENT ANSWERING THE QUALITY-OF-LIFE ASSESSMENT QUESTIONS ABOUT: NONE OF THE ABOVE
DATE THE QUALITY-OF-LIFE ASSESSMENT WAS COMPLETED: 67423
ARE THERE EXCLUSIONS OR EXCEPTIONS FOR THE QUALITY OF LIFE ASSESSMENT: NO
RATE HOW EMOTIONALLY BOTHERED YOU ARE BY YOUR SKIN PROBLEM (FOR EXAMPLE, WORRY, EMBARRASSMENT, FRUSTRATION): 0 - NEVER BOTHERED
RATE HOW BOTHERED YOU ARE BY EFFECTS OF YOUR SKIN PROBLEMS ON YOUR ACTIVITIES (EG, GOING OUT, ACCOMPLISHING WHAT YOU WANT, WORK ACTIVITIES OR YOUR RELATIONSHIPS WITH OTHERS): 0 - NEVER BOTHERED